# Patient Record
Sex: FEMALE | Race: WHITE
[De-identification: names, ages, dates, MRNs, and addresses within clinical notes are randomized per-mention and may not be internally consistent; named-entity substitution may affect disease eponyms.]

---

## 2023-02-20 ENCOUNTER — HOSPITAL ENCOUNTER (OUTPATIENT)
Facility: HOSPITAL | Age: 53
Setting detail: RECURRING SERIES
Discharge: HOME | End: 2023-02-23
Payer: COMMERCIAL

## 2023-02-20 PROCEDURE — 97161 PT EVAL LOW COMPLEX 20 MIN: CPT

## 2023-02-20 PROCEDURE — 97535 SELF CARE MNGMENT TRAINING: CPT

## 2023-02-20 NOTE — THERAPY EVALUATION
[address] [facility] PHYSICAL THERAPY  [address] [facility], [address], [place], [address] [place] [address] Ph: [phone] Fx: [phone]  Plan of Care / Statement of Necessity for Physical Therapy Services     Patient Name: Dagoberto Whitney : 1970   Medical   Diagnosis: Right knee pain [M25.561]  Treatment Diagnosis: Right knee pain [M25.561]   Onset Date:      Referral Source: Jacob Ugarte, [place]* Start of Care [facility], [place], operated by [facility]): 2023   Prior Hospitalization: See medical history Provider #: [id number]   Prior Level of Function: IND all ADLs with some pain with squatting due to R knee pain   Comorbidities: HTN    Medications: Verified on Patient Summary List     Assessment / key information:  Dagoberto Whitney is a 50 y.o. female who presents to skilled PT for the treatment diagnosis of upper back and neck pain. Recalls that about 8 years ago she states that she \"tore\" a muscle between the shoulder blade towards the spine while working her job. Currently, the pain can also radiate up towards the neck along the L side of the neck. Thinks this got worse initiating crossfit a few weeks ago. She had some numbness down the R arm down to the elbow and to the pink finger. This has resolved. Denies N/T down L UE. Pt is R hand dominant. Denies prior Xrays for the back or neck. The pain has been getting better. Endorses a lot of stiffness in the back. Pt also c/o R knee pain too. This has been going on for a while and has gotten worse since working out. Notes joint noise as well as pain with squats. Pain:   Current: 3/10     Worst: 9/10    Best: 2/10      Objective:    Posture: FHRS, stiffness in T/S, slight increased T/S kyphosis    Functional Activity:  Functional ER - (L) no p!   (R) no p! Functional IR - (L) no p!    (R) limited no p!   Functional reach OH - painful L flexion and ABD with some limitation  Limited R   Cross body stretch - slight pulling for L UE in posterior   Prone lying - no pain   Prone on elbow - no neck pain or back pain    Myotomes: NT    Dermatomes: slight changes in T6 in rhomboid and C8-T1 distribution     Cervical AROM/PROM  Flexion: 25% pain down the L side of the T/S / increased pain in the   Extension: 90% pain alleviation   (L) SB: 75% pain alleviation   (R) SB: 50% p! In the L side with tightness   (L) Rot: 65 deg no p! (R) Rot: 65 deg no p! Thoracolumbar AROM  L Rot: 25% no p!  R Rot: 50% no p! Palpation - CPA to T6 causes some radicular symptoms in the L posterior arm. 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 Pt educated on centralization vs peripheralization. Pt favors extension and L SB for elimination of symptoms. Pt would benefit form skilled PT services addressing the current ROM, strength, functional performance, and balance impairments so that the patient to return to performing all ADLs with minimal restriction/limitation or without any functional limitations. HEP provided to the patient in order to promote improvement and self management of symptoms and functional performance     Evaluation Complexity:  History:  LOW Complexity : Zero comorbidities / personal factors that will impact the outcome / POC; Examination:  MEDIUM Complexity : 3 Standardized tests and measures addressin body structure, function, activity limitation and / or participation in recreation  ;Presentation:  MEDIUM Complexity : Evolving with changing characteristics  ; Clinical Decision Making:  MEDIUM Complexity : FOTO score of 26-74 FOTO score = an established functional score where 100 = no disability  Overall Complexity Rating: LOW   Problem List: pain affecting function, decrease ROM, decrease strength, edema affection function, decrease ADL/functional abilities, decrease activity tolerance, decrease flexibility/joint mobility, and decrease transfer abilities    Treatment Plan may include any combination of the followin Therapeutic Exercise, 18537 Neuromuscular Re-Education, 65991 Manual Therapy, 00335 Therapeutic Activity, 54244 Self Care/Home Management, 96488 Electrical Stim unattended, 00393 Mechanical Traction, 58487 Needle Insertion w/o Injection (1 or 2 muscles), and 97611 Needle Insertion w/o Injection (3+ muscles)  Patient / Family readiness to learn indicated by: asking questions, trying to perform skills, interest, return verbalization , and return demonstration   Persons(s) to be included in education: patient (P)  Barriers to Learning/Limitations: none  Measures taken if barriers to learning present: NA  Patient Goal (s): \"find pain relief when in different positions when I sleep and a better way to workout w/o pain\"  Patient Self Reported Health Status: fair  Rehabilitation Potential: good    Short Term Goals: To be accomplished in 4 weeks  1) Pt will be IND with HEP to facilitate self care management. 2) Pt will improve thoracic rot ROM to >50% to order to improve ability to rotate without restriction  for ADLs    3) Pt will report decreased in neurogenic symptoms >50% for ease of symptoms with ADLs    Long Term Goals: To be accomplished in 6 weeks  1) Pt will maintain an average pain of 3/10 or less with all activities showing a progression in overall pain levels despite increases in activity. 2) Pt will improve FOTO scores to 67 in order to show detectable change in overall function. 3) Pt will be able to sleep without disturbance due to upper back pain >6 hours.    4) Pt will improve thoracic rot ROM to >75% and cervical ROM >75% without symptoms to order to improve ability to move without restriction  for ADLs      Frequency / Duration: Patient to be seen 2 times per week for 6 weeks    Patient/ Caregiver education and instruction: Diagnosis, prognosis, self care, activity modification, and exercises [x]  Plan of care has been reviewed with PTA    Certification Period: SAL Alfred, PT       2/20/2023       7:46 AM  ===================================================================  I certify that the above Therapy Services are being furnished while the patient is under my care. I agree with the treatment plan and certify that this therapy is necessary. [de-identified] Signature:_________________________   DATE:_________   TIME:________                           ODETTE Canseco*    ** Signature, Date and Time must be completed for valid certification **  Please sign and fax to Marcel Olsen [facility]6 [phone].   Thank you

## 2023-02-20 NOTE — PROGRESS NOTES
PHYSICAL / OCCUPATIONAL THERAPY - DAILY TREATMENT NOTE (updated )  For Eval visit    Patient Name: Ramiro Black    Date: 2023    : 1970  Insurance: Payor: Lily Marte / Plan: Lily Marte / Product Type: *No Product type* /      Patient  verified yes     Visit #   Current / Total 1 12   Time   In / Out 1000 1055   Pain   In / Out 3 2   Subjective Functional Status/Changes: See POC   Changes to:  Meds, Allergies, Med Hx, Sx Hx? If yes, update Summary List no       TREATMENT AREA =  Right knee pain [M25.561]  Low back pain [M54.50]    OBJECTIVE    Modalities Rationale:     decrease edema, decrease inflammation, and decrease pain to improve patient's ability to progress to PLOF and address remaining functional goals. min [] Estim Unattended, type/location:                                      []  w/ice    []  w/heat    min [] Estim Attended, type/location:                                     []  w/US     []  w/ice    []  w/heat    []  TENS insruct      min []  Mechanical Traction: type/lbs                   []  pro   []  sup   []  int   []  cont    []  before manual    []  after manual    min []  Ultrasound, settings/location:      min []  Iontophoresis w/ dexamethasone, location:                                               []  take home patch       []  in clinic   10 min  unbill [x]  Ice     []  Heat    location/position: Supine CS and TS    min []  Paraffin,  details:     min []  Vasopneumatic Device, press/temp:     min []  Mere Flakes / Elfredia Skeeters: If using vaso (only need to measure limb vaso being performed on)      pre-treatment girth :       post-treatment girth :       measured at (landmark location) :      min []  Other:    Skin assessment post-treatment (if applicable):    []  intact    []  redness- no adverse reaction                 []redness - adverse reaction:           20 min   Eval - untimed                      Therapeutic Procedures:   Tx Min Billable or 1:1 Min (if diff from [employer]) Procedure, Rationale, Specifics   25  03908 Self Care/Home Management (timed):  improve patient knowledge and understanding of pain reducing techniques, positioning, posture/ergonomics, home safety, activity modification, diagnosis/prognosis, physical therapy expectations, procedures and progression, and centralization of symptoms, directional preference  to improve patient's ability to progress to PLOF and address remaining functional goals. (see flow sheet as applicable)     Details if applicable:            Details if applicable:            Details if applicable:            Details if applicable:     [address] [facility] Totals Reminder: bill using total billable min of TIMED therapeutic procedures (example: do not include dry needle or estim unattended, both untimed codes, in totals to left)  8-22 min = 1 unit; 23-37 min = 2 units; 38-52 min = 3 units; 53-67 min = 4 units; 68-82 min = 5 units   Total Total     [x]  Patient Education billed concurrently with other procedures   [x] Review HEP    [] Progressed/Changed HEP, detail:    [] Other detail:       Objective Information/Functional Measures/Assessment    See POC    Patient will continue to benefit from skilled PT / OT services to modify and progress therapeutic interventions, analyze and address functional mobility deficits, analyze and address ROM deficits, analyze and address strength deficits, analyze and address soft tissue restrictions, analyze and cue for proper movement patterns, and analyze and modify for postural abnormalities to address functional deficits and attain remaining goals. Progress toward goals / Updated goals:  [x]  See POC    Short Term Goals: To be accomplished in 4 weeks  1) Pt will be IND with HEP to facilitate self care management.    2) Pt will improve thoracic rot ROM to >50% to order to improve ability to rotate without restriction  for ADLs    3) Pt will report decreased in neurogenic symptoms >50% for ease of symptoms with ADLs Long Term Goals: To be accomplished in 6 weeks  1) Pt will maintain an average pain of 3/10 or less with all activities showing a progression in overall pain levels despite increases in activity. 2) Pt will improve FOTO scores to 67 in order to show detectable change in overall function. 3) Pt will be able to sleep without disturbance due to upper back pain >6 hours.    4) Pt will improve thoracic rot ROM to >75% and cervical ROM >75% without symptoms to order to improve ability to move without restriction  for ADLs      PLAN  yes Continue plan of care  []  Upgrade activities as tolerated  []  Discharge due to :  []  Other:    Natalia Zhou, PT    2/20/2023    12:49 PM    Future Appointments   Date Time Provider Madeline More   2/23/2023  3:00 PM Natalia Zhou, PT [facility]   2/28/2023  4:00 PM Kristine Bryant, PTA [facility]   3/2/2023  2:30 PM Natalia Zhou, PT [facility]   3/7/2023  4:30 PM Natalia Zhou, PT [facility]   3/9/2023  4:30 PM Natalia Zhou, PT [facility]   3/14/2023  4:30 PM Natalia Zhou, PT [facility]   3/16/2023  4:30 PM Natalia Zhou, PT [facility]   3/21/2023  5:00 PM Natalia Zhou, PT [facility]   3/23/2023  4:30 PM Natalia Zhou, PT [facility]

## 2023-02-23 ENCOUNTER — HOSPITAL ENCOUNTER (OUTPATIENT)
Facility: HOSPITAL | Age: 53
Setting detail: RECURRING SERIES
Discharge: HOME | End: 2023-02-26
Payer: COMMERCIAL

## 2023-02-23 PROCEDURE — 97112 NEUROMUSCULAR REEDUCATION: CPT

## 2023-02-23 PROCEDURE — 97140 MANUAL THERAPY 1/> REGIONS: CPT

## 2023-02-23 PROCEDURE — 97110 THERAPEUTIC EXERCISES: CPT

## 2023-02-23 NOTE — PROGRESS NOTES
PHYSICAL / OCCUPATIONAL THERAPY - DAILY TREATMENT NOTE (updated )    Patient Name: Xavier Lab    Date: 2023    : 1970  Insurance: Payor: Paulo Gomez / Plan: Paulo Prina / Product Type: *No Product type* /      Patient  verified Yes     Visit #   Current / Total 2 12   Time   In / Out 300 340   Pain   In / Out 5/10 3/10   Subjective Functional Status/Changes: The exercises seems to clear up the L arm pain. Feeling some pain in the  middle of the back area. Changes to:  Meds, Allergies, Med Hx, Sx Hx? If yes, update Summary List no       TREATMENT AREA =  Pain in thoracic spine [M54.6]    OBJECTIVE    Modalities Rationale:     decrease edema, decrease inflammation, and decrease pain to improve patient's ability to progress to PLOF and address remaining functional goals. min [] Estim Unattended, type/location:                                      []  w/ice    []  w/heat    min [] Estim Attended, type/location:                                     []  w/US     []  w/ice    []  w/heat    []  TENS insruct      min []  Mechanical Traction: type/lbs                   []  pro   []  sup   []  int   []  cont    []  before manual    []  after manual    min []  Ultrasound, settings/location:      min []  Iontophoresis w/ dexamethasone, location:                                               []  take home patch       []  in clinic   10 min  unbill [x]  Ice     []  Heat    location/position: Supine TS    min []  Paraffin,  details:     min []  Vasopneumatic Device, press/temp:     min []  Alonzo Spittle / Israel Sexton: If using vaso (only need to measure limb vaso being performed on)      pre-treatment girth :       post-treatment girth :       measured at (landmark location) :      min []  Other:    Skin assessment post-treatment (if applicable):    []  intact    []  redness- no adverse reaction                 []redness - adverse reaction:         Therapeutic Procedures:   Tx Min Billable or 1:1 Min (if diff from [employer]) Procedure, Rationale, Specifics   8  61664 Manual Therapy (timed):  decrease pain, increase ROM, and increase tissue extensibility to improve patient's ability to progress to PLOF and address remaining functional goals. The manual therapy interventions were performed at a separate and distinct time from the therapeutic activities interventions . (see flow sheet as applicable)     Details if applicable:    Prone over 1 pillow - T/S L rot mobs, via B TP CPA for glide, R UPA for L rot gr 2-4     12  93564 Therapeutic Exercise (timed):  increase ROM, strength, coordination, balance, and proprioception to improve patient's ability to progress to PLOF and address remaining functional goals. (see flow sheet as applicable)     Details if applicable:     10  53962 Self Care/Home Management (timed):  improve patient knowledge and understanding of pain reducing techniques, positioning, diagnosis/prognosis, and updated HEP  to improve patient's ability to progress to PLOF and address remaining functional goals. (see flow sheet as applicable)     Details if applicable:            Details if applicable:            Details if applicable:     27  [facility] Totals Reminder: bill using total billable min of TIMED therapeutic procedures (example: do not include dry needle or estim unattended, both untimed codes, in totals to left)  8-22 min = 1 unit; 23-37 min = 2 units; 38-52 min = 3 units; 53-67 min = 4 units; 68-82 min = 5 units   Total Total     [x]  Patient Education billed concurrently with other procedures   [x] Review HEP    [x] Progressed/Changed HEP, detail:  Access Code: [email or web address]  URL: [email or web address]  Date: 02/23/2023  Prepared by: Aditi Saez    Exercises  Seated Cervical Extension AROM - 1-4 x daily - 7 x weekly - 10-15 reps  Cervical Extension Prone on Elbows - 1-4 x daily - 7 x weekly - 10-15 reps  Seated Cervical Sidebending AROM - 1-4 x daily - 7 x weekly - 10-15 reps  Seated Thoracic Flexion and Rotation with Arms Crossed - 1-4 x daily - 7 x weekly - 5 reps  Ulnar Nerve Flossing - 1-2 x daily - 7 x weekly - 3 sets - 10 reps  Sidelying Open Book Thoracic Lumbar Rotation and Extension - 1-2 x daily - 7 x weekly - 10 reps  Sleeper Stretch - 1-2 x daily - 7 x weekly - 10 reps - 5\" hold  Standing Posterior Cuff Stretch - 1-4 x daily - 7 x weekly - 2 sets - 20-30\" hold    [] Other detail:       Objective Information/Functional Measures/Assessment    Patient tolerated today's treatment well. L rot more limited than the R rot. Improved greatly post manual. Added shoulder posterior capsule stretches to HEP and open books    Patient will continue to benefit from skilled PT / OT services to modify and progress therapeutic interventions, analyze and address functional mobility deficits, analyze and address ROM deficits, analyze and address strength deficits, analyze and address soft tissue restrictions, analyze and cue for proper movement patterns, analyze and modify for postural abnormalities, and instruct in home and community integration to address functional deficits and attain remaining goals. Progress toward goals / Updated goals:  []  See Progress Note/Recertification    Short Term Goals: To be accomplished in 4 weeks  1) Pt will be IND with HEP to facilitate self care management. 2) Pt will improve thoracic rot ROM to >50% to order to improve ability to rotate without restriction  for ADLs  Progressing 2/23/23  3) Pt will report decreased in neurogenic symptoms >50% for ease of symptoms with ADLs     Long Term Goals: To be accomplished in 6 weeks  1) Pt will maintain an average pain of 3/10 or less with all activities showing a progression in overall pain levels despite increases in activity. 2) Pt will improve FOTO scores to 67 in order to show detectable change in overall function. 3) Pt will be able to sleep without disturbance due to upper back pain >6 hours.    4) Pt will improve thoracic rot ROM to >75% and cervical ROM >75% without symptoms to order to improve ability to move without restriction  for ADLs      All goals remain applicable at this time.      PLAN  Yes  Continue plan of care  [x]  Upgrade activities as tolerated  []  Discharge due to :  []  Other:    Geno Freeman, PT    2/23/2023    1:12 PM    Future Appointments   Date Time Provider Madeline More   2/23/2023  3:00 PM Geno Freeman, PT [facility]   2/28/2023  4:00 PM Earnstine Ring, PTA [facility]   3/2/2023  2:30 PM Geno Creed, PT [facility]   3/7/2023  4:30 PM Geno Creed, PT [facility]   3/9/2023  4:30 PM Earnstine Ring, PTA [facility]   3/14/2023  4:30 PM Billye Creed, PT [facility]   3/16/2023  4:30 PM Earnstine Ring, PTA [facility]   3/21/2023  5:00 PM Earnstine Ring, [facility]   3/23/2023  4:30 PM Billye Creed, PT [facility]

## 2023-02-28 ENCOUNTER — HOSPITAL ENCOUNTER (OUTPATIENT)
Facility: HOSPITAL | Age: 53
Setting detail: RECURRING SERIES
Discharge: HOME | End: 2023-03-03
Payer: COMMERCIAL

## 2023-02-28 PROCEDURE — 97110 THERAPEUTIC EXERCISES: CPT

## 2023-02-28 PROCEDURE — 97140 MANUAL THERAPY 1/> REGIONS: CPT

## 2023-02-28 NOTE — PROGRESS NOTES
PHYSICAL / OCCUPATIONAL THERAPY - DAILY TREATMENT NOTE (updated )    Patient Name: Sanket Model    Date: 2023    : 1970  Insurance: Payor: Liban Pardo / Plan: Liban Pardo / Product Type: *No Product type* /      Patient  verified Yes     Visit #   Current / Total 3 12   Time   In / Out 400 450   Pain   In / Out 2 1   Subjective Functional Status/Changes: Felt better after last session and it's been doing good. No as stiff as I usually am and i've been keeping up the the exercises. Changes to:  Meds, Allergies, Med Hx, Sx Hx? If yes, update Summary List no       TREATMENT AREA =  Pain in thoracic spine [M54.6]    OBJECTIVE    Modalities Rationale:     decrease edema, decrease inflammation, and decrease pain to improve patient's ability to progress to PLOF and address remaining functional goals. min [] Estim Unattended, type/location:                                      []  w/ice    []  w/heat    min [] Estim Attended, type/location:                                     []  w/US     []  w/ice    []  w/heat    []  TENS insruct      min []  Mechanical Traction: type/lbs                   []  pro   []  sup   []  int   []  cont    []  before manual    []  after manual    min []  Ultrasound, settings/location:      min []  Iontophoresis w/ dexamethasone, location:                                               []  take home patch       []  in clinic   10 min  unbill [x]  Ice     []  Heat    location/position: Supine TS    min []  Paraffin,  details:     min []  Vasopneumatic Device, press/temp:     min []  Jax Black / Fela Lyme: If using vaso (only need to measure limb vaso being performed on)      pre-treatment girth :       post-treatment girth :       measured at (landmark location) :      min []  Other:    Skin assessment post-treatment (if applicable):    []  intact    []  redness- no adverse reaction                 []redness - adverse reaction:         Therapeutic Procedures:   Tx Min Billable or 1:1 Min (if diff from Tx Min) Procedure, Rationale, Specifics   15  75270 Manual Therapy (timed):  decrease pain, increase ROM, and increase tissue extensibility to improve patient's ability to progress to PLOF and address remaining functional goals. The manual therapy interventions were performed at a separate and distinct time from the therapeutic activities interventions . (see flow sheet as applicable)     Details if applicable:    Prone over 1 pillow - T/S L rot mobs, via B TP CPA for glide, R UPA for L rot gr 2-4     25  20371 Therapeutic Exercise (timed):  increase ROM, strength, coordination, balance, and proprioception to improve patient's ability to progress to PLOF and address remaining functional goals. (see flow sheet as applicable)     Details if applicable:       54114 Self Care/Home Management (timed):  improve patient knowledge and understanding of pain reducing techniques, positioning, diagnosis/prognosis, and updated HEP  to improve patient's ability to progress to PLOF and address remaining functional goals. (see flow sheet as applicable)     Details if applicable:            Details if applicable:            Details if applicable:     36  [facility] Totals Reminder: bill using total billable min of TIMED therapeutic procedures (example: do not include dry needle or estim unattended, both untimed codes, in totals to left)  8-22 min = 1 unit; 23-37 min = 2 units; 38-52 min = 3 units; 53-67 min = 4 units; 68-82 min = 5 units   Total Total     [x]  Patient Education billed concurrently with other procedures   [x] Review HEP    [x] Progressed/Changed HEP, detail:      [] Other detail:       Objective Information/Functional Measures/Assessment    Limited motion of T/S with decrease spring test.  Add 1/2 foam supine x 3 minutes and 1/2 foam perpendicular 5 x 5 seconds.     Patient will continue to benefit from skilled PT / OT services to modify and progress therapeutic interventions, analyze and address functional mobility deficits, analyze and address ROM deficits, analyze and address strength deficits, analyze and address soft tissue restrictions, analyze and cue for proper movement patterns, analyze and modify for postural abnormalities, and instruct in home and community integration to address functional deficits and attain remaining goals. Progress toward goals / Updated goals:  []  See Progress Note/Recertification    Short Term Goals: To be accomplished in 4 weeks  1) Pt will be IND with HEP to facilitate self care management. 2) Pt will improve thoracic rot ROM to >50% to order to improve ability to rotate without restriction  for ADLs  Progressing 2/23/23  3) Pt will report decreased in neurogenic symptoms >50% for ease of symptoms with ADLs     Long Term Goals: To be accomplished in 6 weeks  1) Pt will maintain an average pain of 3/10 or less with all activities showing a progression in overall pain levels despite increases in activity. 2) Pt will improve FOTO scores to 67 in order to show detectable change in overall function. 3) Pt will be able to sleep without disturbance due to upper back pain >6 hours. 4) Pt will improve thoracic rot ROM to >75% and cervical ROM >75% without symptoms to order to improve ability to move without restriction  for ADLs      All goals remain applicable at this time.      PLAN  Yes  Continue plan of care  [x]  Upgrade activities as tolerated  []  Discharge due to :  []  Other:    Eloy Friedman PTA    2/28/2023    8:55 AM    Future Appointments   Date Time Provider Madeline More   2/28/2023  4:00 PM Fly Dukes [facility]   3/2/2023  2:30 PM Vicky Souza, PT [facility]   3/7/2023  4:30 PM Vicky Souza, PT [facility]   3/9/2023  4:30 PM Eloy Friedman PTA [facility]   3/14/2023  4:30 PM Vicky Souza, PT [facility]   3/16/2023  4:30 PM Eloy Friedman PTA [facility]   3/21/2023  5:00 PM Eloy Friedman PTA [facility] 3/23/2023  4:30 PM Jacoby Chaudhry, PT [facility] [address]

## 2023-03-02 ENCOUNTER — HOSPITAL ENCOUNTER (OUTPATIENT)
Facility: HOSPITAL | Age: 53
Setting detail: RECURRING SERIES
Discharge: HOME | End: 2023-03-05
Payer: COMMERCIAL

## 2023-03-02 PROCEDURE — 97110 THERAPEUTIC EXERCISES: CPT

## 2023-03-02 PROCEDURE — 97140 MANUAL THERAPY 1/> REGIONS: CPT

## 2023-03-02 NOTE — PROGRESS NOTES
PHYSICAL / OCCUPATIONAL THERAPY - DAILY TREATMENT NOTE (updated )    Patient Name: Alley Rosario    Date: 3/2/2023    : 1970  Insurance: Payor: Ervin Gell / Plan: Ervin Gell / Product Type: *No Product type* /      Patient  verified Yes     Visit #   Current / Total 4 12   Time   In / Out 1105 1145   Pain   In / Out 1/10 1/10   Subjective Functional Status/Changes: State s that looking ot the L has gotten a lot better and less stiffness. Changes to:  Meds, Allergies, Med Hx, Sx Hx? If yes, update Summary List no       TREATMENT AREA =  Pain in thoracic spine [M54.6]    OBJECTIVE    Modalities Rationale:     decrease edema, decrease inflammation, and decrease pain to improve patient's ability to progress to PLOF and address remaining functional goals. min [] Estim Unattended, type/location:                                      []  w/ice    []  w/heat    min [] Estim Attended, type/location:                                     []  w/US     []  w/ice    []  w/heat    []  TENS insruct      min []  Mechanical Traction: type/lbs                   []  pro   []  sup   []  int   []  cont    []  before manual    []  after manual    min []  Ultrasound, settings/location:      min []  Iontophoresis w/ dexamethasone, location:                                               []  take home patch       []  in clinic   10 min  unbill [x]  Ice     []  Heat    location/position: Supine TS    min []  Paraffin,  details:     min []  Vasopneumatic Device, press/temp:     min []  Jodene Massing / Missy Silvius: If using vaso (only need to measure limb vaso being performed on)      pre-treatment girth :       post-treatment girth :       measured at (landmark location) :      min []  Other:    Skin assessment post-treatment (if applicable):    []  intact    []  redness- no adverse reaction                 []redness - adverse reaction:         Therapeutic Procedures:   Tx Min Billable or 1:1 Min (if diff from [employer]) Procedure, Rationale, Specifics   20  03032 Manual Therapy (timed):  decrease pain, increase ROM, and increase tissue extensibility to improve patient's ability to progress to PLOF and address remaining functional goals.  The manual therapy interventions were performed at a separate and distinct time from the therapeutic activities interventions . (see flow sheet as applicable)     Details if applicable:    Prone over 1 pillow - T/S L rot mobs, via B TP CPA for glide, R UPA for L rot gr 2-4  Seated L rot stretch with PT O/P     10  05491 Therapeutic Exercise (timed):  increase ROM, strength, coordination, balance, and proprioception to improve patient's ability to progress to PLOF and address remaining functional goals. (see flow sheet as applicable)     Details if applicable:            Details if applicable:            Details if applicable:            Details if applicable:     30  [facility] Totals Reminder: bill using total billable min of TIMED therapeutic procedures (example: do not include dry needle or estim unattended, both untimed codes, in totals to left)  8-22 min = 1 unit; 23-37 min = 2 units; 38-52 min = 3 units; 53-67 min = 4 units; 68-82 min = 5 units   Total Total     [x]  Patient Education billed concurrently with other procedures   [x] Review HEP    [] Progressed/Changed HEP, detail:  [] Other detail:       Objective Information/Functional Measures/Assessment    Much better L rot ORM by end of manual. Tried doing long ways foam roller X to Y and reported sharp pin point pain in the L rhomboid. No pain with anything else except palpation to the area. Advised to ice to reduce local inflammation. Overall her mobility is getting a lot better    Patient will continue to benefit from skilled PT / OT services to modify and progress therapeutic interventions, analyze and address functional mobility deficits, analyze and address ROM deficits, analyze and address strength deficits, analyze and address soft tissue restrictions,  analyze and cue for proper movement patterns, analyze and modify for postural abnormalities, and instruct in home and community integration to address functional deficits and attain remaining goals. Progress toward goals / Updated goals:  []  See Progress Note/Recertification    Short Term Goals: To be accomplished in 4 weeks  1) Pt will be IND with HEP to facilitate self care management. 2) Pt will improve thoracic rot ROM to >50% to order to improve ability to rotate without restriction  for ADLs  Progressing 2/23/23  3) Pt will report decreased in neurogenic symptoms >50% for ease of symptoms with ADLs. Slight progress (some tingling last session but overall that has gotten better) 3/2/23     Long Term Goals: To be accomplished in 6 weeks  1) Pt will maintain an average pain of 3/10 or less with all activities showing a progression in overall pain levels despite increases in activity. 2) Pt will improve FOTO scores to 67 in order to show detectable change in overall function. 3) Pt will be able to sleep without disturbance due to upper back pain >6 hours. 4) Pt will improve thoracic rot ROM to >75% and cervical ROM >75% without symptoms to order to improve ability to move without restriction  for ADLs      All goals remain applicable at this time.      PLAN  Yes  Continue plan of care  [x]  Upgrade activities as tolerated  []  Discharge due to :  []  Other:    Deanna Rollins, PT    3/2/2023    10:00 AM    Future Appointments   Date Time Provider Madeline More   3/2/2023 11:00 AM Deanna Rollins, PT [facility]   3/7/2023  4:30 PM Deanna Rollins, PT [facility]   3/9/2023  4:30 PM Bijan Branham PTA [facility]   3/14/2023  4:30 PM Deanna Rollins, PT [facility]   3/16/2023  4:30 PM Bijan Branham PTA [facility]   3/21/2023  5:00 PM [facility]   3/23/2023  4:30 PM Deanna Rollins PT [facility]

## 2023-03-07 ENCOUNTER — HOSPITAL ENCOUNTER (OUTPATIENT)
Facility: HOSPITAL | Age: 53
Setting detail: RECURRING SERIES
Discharge: HOME | End: 2023-03-10
Payer: COMMERCIAL

## 2023-03-07 PROCEDURE — 97140 MANUAL THERAPY 1/> REGIONS: CPT

## 2023-03-07 PROCEDURE — 97110 THERAPEUTIC EXERCISES: CPT

## 2023-03-07 NOTE — PROGRESS NOTES
PHYSICAL / OCCUPATIONAL THERAPY - DAILY TREATMENT NOTE (updated )    Patient Name: Joanie Lab    Date: 3/7/2023    : 1970  Insurance: Payor: Milli Ornelas / Plan: Milli Ornelas / Product Type: *No Product type* /      Patient  verified Yes     Visit #   Current / Total 5 12   Time   In / Out 430  530   Pain   In / Out 1.5/10  0/10   Subjective Functional Status/Changes: Flared up the spot today moving boxes. Changes to:  Meds, Allergies, Med Hx, Sx Hx? If yes, update Summary List no       TREATMENT AREA =  Pain in thoracic spine [M54.6]    OBJECTIVE    Modalities Rationale:     decrease edema, decrease inflammation, and decrease pain to improve patient's ability to progress to PLOF and address remaining functional goals. min [] Estim Unattended, type/location:                                      []  w/ice    []  w/heat    min [] Estim Attended, type/location:                                     []  w/US     []  w/ice    []  w/heat    []  TENS insruct      min []  Mechanical Traction: type/lbs                   []  pro   []  sup   []  int   []  cont    []  before manual    []  after manual    min []  Ultrasound, settings/location:      min []  Iontophoresis w/ dexamethasone, location:                                               []  take home patch       []  in clinic   10 min  unbill [x]  Ice     []  Heat    location/position: Supine TS    min []  Paraffin,  details:     min []  Vasopneumatic Device, press/temp:     min []  Nikolaylar Gunning / Jason Freud: If using vaso (only need to measure limb vaso being performed on)      pre-treatment girth :       post-treatment girth :       measured at (landmark location) :      min []  Other:    Skin assessment post-treatment (if applicable):    []  intact    []  redness- no adverse reaction                 []redness - adverse reaction:         Therapeutic Procedures:   Tx Min Billable or 1:1 Min (if diff from Tx Min) Procedure, Rationale, Specifics   [id number] Manual Therapy (timed):  decrease pain, increase ROM, and increase tissue extensibility to improve patient's ability to progress to PLOF and address remaining functional goals. The manual therapy interventions were performed at a separate and distinct time from the therapeutic activities interventions . (see flow sheet as applicable)     Details if applicable:    Prone over 1 pillow - T/S L rot mobs, via B TP CPA for glide, R UPA for L rot gr 2-4  Prone/supine DTM along L UT, LS, CS paraspinals     20  21270 Therapeutic Exercise (timed):  increase ROM, strength, coordination, balance, and proprioception to improve patient's ability to progress to PLOF and address remaining functional goals. (see flow sheet as applicable)     Details if applicable:            Details if applicable:            Details if applicable:            Details if applicable:     48   [facility] Totals Reminder: bill using total billable min of TIMED therapeutic procedures (example: do not include dry needle or estim unattended, both untimed codes, in totals to left)  8-22 min = 1 unit; 23-37 min = 2 units; 38-52 min = 3 units; 53-67 min = 4 units; 68-82 min = 5 units   Total Total     [x]  Patient Education billed concurrently with other procedures   [x] Review HEP    [] Progressed/Changed HEP, detail:  [] Other detail:       Objective Information/Functional Measures/Assessment    Pt shown kneeling TS extension mobility exercises today.  Overall she tolerated this much better than other TS ext stretches    Patient will continue to benefit from skilled PT / OT services to modify and progress therapeutic interventions, analyze and address functional mobility deficits, analyze and address ROM deficits, analyze and address strength deficits, analyze and address soft tissue restrictions, analyze and cue for proper movement patterns, analyze and modify for postural abnormalities, and instruct in home and community integration to address functional deficits and attain remaining goals. Progress toward goals / Updated goals:  []  See Progress Note/Recertification    Short Term Goals: To be accomplished in 4 weeks  1) Pt will be IND with HEP to facilitate self care management. 2) Pt will improve thoracic rot ROM to >50% to order to improve ability to rotate without restriction  for ADLs  Progressing 2/23/23  3) Pt will report decreased in neurogenic symptoms >50% for ease of symptoms with ADLs. Slight progress (some tingling last session but overall that has gotten better) 3/2/23     Long Term Goals: To be accomplished in 6 weeks  1) Pt will maintain an average pain of 3/10 or less with all activities showing a progression in overall pain levels despite increases in activity. 2) Pt will improve FOTO scores to 67 in order to show detectable change in overall function. 3) Pt will be able to sleep without disturbance due to upper back pain >6 hours. 4) Pt will improve thoracic rot ROM to >75% and cervical ROM >75% without symptoms to order to improve ability to move without restriction  for ADLs      All goals remain applicable at this time.      PLAN  Yes  Continue plan of care  [x]  Upgrade activities as tolerated  []  Discharge due to :  []  Other:    Kyleigh Garzon, PT    3/7/2023    2:10 PM    Future Appointments   Date Time Provider Madeline More   3/7/2023  4:30 PM Kyleigh Garzon, PT [facility] [address]   3/9/2023  4:30 PM Cb Wolf, [facility] [address]   3/14/2023  4:30 PM Kyleigh Garzon PT [facility] [address]   3/16/2023  4:30 PM Cb Wolf PTA [facility] [address]   3/21/2023  5:00 PM Cb Wolf [facility] [address]   3/23/2023  4:30 PM [facility] [address]

## 2023-03-09 ENCOUNTER — HOSPITAL ENCOUNTER (OUTPATIENT)
Facility: HOSPITAL | Age: 53
Setting detail: RECURRING SERIES
Discharge: HOME | End: 2023-03-12
Payer: COMMERCIAL

## 2023-03-09 PROCEDURE — 97140 MANUAL THERAPY 1/> REGIONS: CPT

## 2023-03-09 PROCEDURE — 97110 THERAPEUTIC EXERCISES: CPT

## 2023-03-09 NOTE — PROGRESS NOTES
PHYSICAL / OCCUPATIONAL THERAPY - DAILY TREATMENT NOTE (updated )    Patient Name: Faith Carney    Date: 3/9/2023    : 1970  Insurance: Payor: Eloina Linder / Plan: Eloina Linder / Product Type: *No Product type* /      Patient  verified Yes     Visit #   Current / Total 6 12   Time   In / Out 430 520   Pain   In / Out 0 1   Subjective Functional Status/Changes: I've got this numbness in my (R) elbow area that comes and goes and today it hasn't gone away. But right now I don't really have pain. Changes to:  Meds, Allergies, Med Hx, Sx Hx? If yes, update Summary List no       TREATMENT AREA =  Pain in thoracic spine [M54.6]    OBJECTIVE    Modalities Rationale:     decrease edema, decrease inflammation, and decrease pain to improve patient's ability to progress to PLOF and address remaining functional goals. min [] Estim Unattended, type/location:                                      []  w/ice    []  w/heat    min [] Estim Attended, type/location:                                     []  w/US     []  w/ice    []  w/heat    []  TENS insruct      min []  Mechanical Traction: type/lbs                   []  pro   []  sup   []  int   []  cont    []  before manual    []  after manual    min []  Ultrasound, settings/location:      min []  Iontophoresis w/ dexamethasone, location:                                               []  take home patch       []  in clinic   10 min  unbill [x]  Ice     []  Heat    location/position: Supine TS    min []  Paraffin,  details:     min []  Vasopneumatic Device, press/temp:     min []  Elvira Cuellar / Mayra Robles: If using vaso (only need to measure limb vaso being performed on)      pre-treatment girth :       post-treatment girth :       measured at (landmark location) :      min []  Other:    Skin assessment post-treatment (if applicable):    []  intact    []  redness- no adverse reaction                 []redness - adverse reaction:         Therapeutic Procedures:   Tx Min Billable or 1:1 Min (if diff from Tx Min) Procedure, Rationale, Specifics   15  29615 Manual Therapy (timed):  decrease pain, increase ROM, and increase tissue extensibility to improve patient's ability to progress to PLOF and address remaining functional goals. The manual therapy interventions were performed at a separate and distinct time from the therapeutic activities interventions . (see flow sheet as applicable)     Details if applicable:    Prone over 1 pillow - T/S L rot mobs, via B TP CPA for glide, R UPA for L rot gr 2-4     25  37453 Therapeutic Exercise (timed):  increase ROM, strength, coordination, balance, and proprioception to improve patient's ability to progress to PLOF and address remaining functional goals. (see flow sheet as applicable)     Details if applicable:       19689 Self Care/Home Management (timed):  improve patient knowledge and understanding of pain reducing techniques, positioning, diagnosis/prognosis, and updated HEP  to improve patient's ability to progress to PLOF and address remaining functional goals. (see flow sheet as applicable)     Details if applicable:            Details if applicable:            Details if applicable:     36  [facility] Totals Reminder: bill using total billable min of TIMED therapeutic procedures (example: do not include dry needle or estim unattended, both untimed codes, in totals to left)  8-22 min = 1 unit; 23-37 min = 2 units; 38-52 min = 3 units; 53-67 min = 4 units; 68-82 min = 5 units   Total Total     [x]  Patient Education billed concurrently with other procedures   [x] Review HEP    [x] Progressed/Changed HEP, detail:      [] Other detail:       Objective Information/Functional Measures/Assessment    Reports able to sleep 6 hours and not being disturbed from pain. Increase spring mobility of T/S.     Patient will continue to benefit from skilled PT / OT services to modify and progress therapeutic interventions, analyze and address functional mobility deficits, analyze and address ROM deficits, analyze and address strength deficits, analyze and address soft tissue restrictions, analyze and cue for proper movement patterns, analyze and modify for postural abnormalities, and instruct in home and community integration to address functional deficits and attain remaining goals. Progress toward goals / Updated goals:  []  See Progress Note/Recertification    Short Term Goals: To be accomplished in 4 weeks  1) Pt will be IND with HEP to facilitate self care management. 2) Pt will improve thoracic rot ROM to >50% to order to improve ability to rotate without restriction  for ADLs  Progressing 2/23/23  3) Pt will report decreased in neurogenic symptoms >50% for ease of symptoms with ADLs: progressing well 3/9/23     Long Term Goals: To be accomplished in 6 weeks  1) Pt will maintain an average pain of 3/10 or less with all activities showing a progression in overall pain levels despite increases in activity. 2) Pt will improve FOTO scores to 67 in order to show detectable change in overall function. 3) Pt will be able to sleep without disturbance due to upper back pain >6 hours. Progressing well 3/9/23  4) Pt will improve thoracic rot ROM to >75% and cervical ROM >75% without symptoms to order to improve ability to move without restriction  for ADLs      All goals remain applicable at this time.      PLAN  Yes  Continue plan of care  [x]  Upgrade activities as tolerated  []  Discharge due to :  []  Other:    Stephanie Valdivia PTA    3/9/2023    9:01 AM    Future Appointments   Date Time Provider Madeline More   3/9/2023  4:30 PM Stephanie Valdivia PTA [facility]   3/14/2023  4:30 PM Leonard Murphy PT [facility]   3/16/2023  4:30 PM Stephanie Skip PTA [facility]   3/21/2023  5:00 PM Stephanie MICHEAL Valdivia [facility]   3/23/2023  4:30 [facility]

## 2023-03-14 ENCOUNTER — HOSPITAL ENCOUNTER (OUTPATIENT)
Facility: HOSPITAL | Age: 53
Setting detail: RECURRING SERIES
Discharge: HOME | End: 2023-03-17
Payer: COMMERCIAL

## 2023-03-14 PROCEDURE — 97140 MANUAL THERAPY 1/> REGIONS: CPT

## 2023-03-14 PROCEDURE — 97110 THERAPEUTIC EXERCISES: CPT

## 2023-03-14 NOTE — PROGRESS NOTES
PHYSICAL / OCCUPATIONAL THERAPY - DAILY TREATMENT NOTE (updated )    Patient Name: Evans Burns    Date: 3/14/2023    : 1970  Insurance: Payor: Patrina Schirmer / Plan: Patrina Schirmer / Product Type: *No Product type* /      Patient  verified Yes     Visit #   Current / Total 7 12   Time   In / Out 435 510   Pain   In / Out 3/10 1/10   Subjective Functional Status/Changes: Pain is worse in the L rhomboid area today. Unsure if overdid it at work with lifting heavier boxes. Changes to:  Meds, Allergies, Med Hx, Sx Hx? If yes, update Summary List no       TREATMENT AREA =  Pain in thoracic spine [M54.6]    OBJECTIVE    Modalities Rationale:     decrease edema, decrease inflammation, and decrease pain to improve patient's ability to progress to PLOF and address remaining functional goals. min [] Estim Unattended, type/location:                                      []  w/ice    []  w/heat    min [] Estim Attended, type/location:                                     []  w/US     []  w/ice    []  w/heat    []  TENS insruct      min []  Mechanical Traction: type/lbs                   []  pro   []  sup   []  int   []  cont    []  before manual    []  after manual    min []  Ultrasound, settings/location:      min []  Iontophoresis w/ dexamethasone, location:                                               []  take home patch       []  in clinic   10 min  unbill [x]  Ice     []  Heat    location/position: Supine TS    min []  Paraffin,  details:     min []  Vasopneumatic Device, press/temp:     min []  Tamar Piles / Alia Alba: If using vaso (only need to measure limb vaso being performed on)      pre-treatment girth :       post-treatment girth :       measured at (landmark location) :      min []  Other:    Skin assessment post-treatment (if applicable):    []  intact    []  redness- no adverse reaction                 []redness - adverse reaction:         Therapeutic Procedures:   Tx Min Billable or 1:1 Min (if diff from Tx Min) Procedure, Rationale, Specifics   15  90760 Manual Therapy (timed):  decrease pain, increase ROM, and increase tissue extensibility to improve patient's ability to progress to PLOF and address remaining functional goals. The manual therapy interventions were performed at a separate and distinct time from the therapeutic activities interventions . (see flow sheet as applicable)     Details if applicable:    Prone over 1 pillow - DTM along L rhomboids and TS paraspinals / TS CPA   10  31125 Therapeutic Exercise (timed):  increase ROM, strength, coordination, balance, and proprioception to improve patient's ability to progress to PLOF and address remaining functional goals.  (see flow sheet as applicable)     Details if applicable:            Details if applicable:            Details if applicable:            Details if applicable:     28  [facility] Totals Reminder: bill using total billable min of TIMED therapeutic procedures (example: do not include dry needle or estim unattended, both untimed codes, in totals to left)  8-22 min = 1 unit; 23-37 min = 2 units; 38-52 min = 3 units; 53-67 min = 4 units; 68-82 min = 5 units   Total Total     [x]  Patient Education billed concurrently with other procedures   [x] Review HEP    [] Progressed/Changed HEP, detail:  [] Other detail:       Objective Information/Functional Measures/Assessment    TTP along rhomboid    Felt better without less pain looking down in the L rhomboid post manual. Pt reported increased pain and weakness with shoulder ER, showing possible strength deficits here leading to compensation via the rhomboids    Patient will continue to benefit from skilled PT / OT services to modify and progress therapeutic interventions, analyze and address functional mobility deficits, analyze and address ROM deficits, analyze and address strength deficits, analyze and address soft tissue restrictions, analyze and cue for proper movement patterns, analyze and modify for postural abnormalities, and instruct in home and community integration to address functional deficits and attain remaining goals. Progress toward goals / Updated goals:  []  See Progress Note/Recertification    Short Term Goals: To be accomplished in 4 weeks  1) Pt will be IND with HEP to facilitate self care management. 2) Pt will improve thoracic rot ROM to >50% to order to improve ability to rotate without restriction  for ADLs  Progressing 2/23/23  3) Pt will report decreased in neurogenic symptoms >50% for ease of symptoms with ADLs: progressing well 3/9/23     Long Term Goals: To be accomplished in 6 weeks  1) Pt will maintain an average pain of 3/10 or less with all activities showing a progression in overall pain levels despite increases in activity. 2) Pt will improve FOTO scores to 67 in order to show detectable change in overall function. 3) Pt will be able to sleep without disturbance due to upper back pain >6 hours. Progressing well 3/9/23  4) Pt will improve thoracic rot ROM to >75% and cervical ROM >75% without symptoms to order to improve ability to move without restriction  for ADLs      All goals remain applicable at this time.      PLAN  Yes  Continue plan of care  [x]  Upgrade activities as tolerated  []  Discharge due to :  []  Other:    Jamie Aden PT    3/14/2023    10:07 AM    Future Appointments   Date Time Provider Madeline More   3/14/2023  4:30 PM Jamie Aden PT [facility]   3/16/2023  4:30 PM Gisel Garrison PTA [facility]   3/21/2023  5:00 PM Gisel Garrison PTA [facility]   3/23/2023  4:30 PM [facility]

## 2023-03-16 ENCOUNTER — HOSPITAL ENCOUNTER (OUTPATIENT)
Facility: HOSPITAL | Age: 53
Setting detail: RECURRING SERIES
Discharge: HOME | End: 2023-03-19
Payer: COMMERCIAL

## 2023-03-16 PROCEDURE — 97032 APPL MODALITY 1+ESTIM EA 15: CPT

## 2023-03-16 PROCEDURE — 97535 SELF CARE MNGMENT TRAINING: CPT

## 2023-03-16 NOTE — THERAPY RECERTIFICATION
[address] [facility] PHYSICAL THERAPY  [address] [facility] [place], [address] [facility] Ph: [phone] Fx: [phone]  PHYSICAL THERAPY PROGRESS NOTE  Patient Name: Marty Ambrosio : 1970   Treatment/Medical Diagnosis: Pain in thoracic spine [M54.6]   Referral Source: [place], [place]*     Date of Initial Visit: 23 Attended Visits: 8 Missed Visits: 0     SUMMARY OF TREATMENT  Pt seen in clinic for to address pain in thoracic region for 8 visits. Pt has been assessed, completed therapeutic exercises, neuromuscular re-ed, therapeutic functional activity, received manual therapy intervention, self-care strategies, HEP techniques and pt ed on condition consistency and follow-through. CURRENT STATUS    GROC: +5 a good deal better     Patient reports ~ 89% overall improvement with performance of all general ADLs and work related activities. Patient reports ~ 95% reduction of symptoms. Patient reports sitting tolerance: no problems, standing tolerance: no problems, walking tolerance: no problems. Pt will maintain an average pain of 3/10 or less with all activities showing a progression in overall pain levels despite increases in activity. Status at last Eval: Pain:   Current: 3/10     Worst: 9/10    Best: 2/10   Current Status: Patient reports max pain 3/10, least pain 0/10, average pain 1/10  Goal Met?  yes    2. Pt will improve FOTO scores to 67 in order to show detectable change in overall function. Status at last Eval: 67  Current Status: 67  Goal Met?  yes    3. Pt will be able to sleep without disturbance due to upper back pain >6 hours. Status at last Eval: unable  Current Status: Reports sleeping ~ 5 hours before onset of pain that will wake her up. Goal Met?   progressing    4.  Pt will improve thoracic rot ROM to >75% and cervical ROM >75% without symptoms to order to improve ability to move without restriction  for ADLs    Status at last

## 2023-03-16 NOTE — PROGRESS NOTES
Manual Therapy (timed):  decrease pain, increase ROM, and increase tissue extensibility to improve patient's ability to progress to PLOF and address remaining functional goals. The manual therapy interventions were performed at a separate and distinct time from the therapeutic activities interventions . (see flow sheet as applicable)     Details if applicable:    Prone over 1 pillow - DTM along L rhomboids and TS paraspinals / TS CPA     28257 Therapeutic Exercise (timed):  increase ROM, strength, coordination, balance, and proprioception to improve patient's ability to progress to PLOF and address remaining functional goals. (see flow sheet as applicable)     Details if applicable:     22     22787 Self Care/Home Management (timed):  improve patient knowledge and understanding of pain reducing techniques, positioning, posture/ergonomics, home safety, activity modification, and reassess goals and function  to improve patient's ability to progress to PLOF and address remaining functional goals. (see flow sheet as applicable)    Details if applicable:            Details if applicable:            Details if applicable:     27  [facility] Totals Reminder: bill using total billable min of TIMED therapeutic procedures (example: do not include dry needle or estim unattended, both untimed codes, in totals to left)  8-22 min = 1 unit; 23-37 min = 2 units; 38-52 min = 3 units; 53-67 min = 4 units; 68-82 min = 5 units   Total Total     [x]  Patient Education billed concurrently with other procedures   [x] Review HEP    [] Progressed/Changed HEP, detail:  [] Other detail:       Objective Information/Functional Measures/Assessment    GROC: +5 a good deal better     FOTO: 67     Patient reports max pain 3/10, least pain 0/10, average pain 1/10     Patient reports ~ 89% overall improvement with performance of all general ADLs and work related activities. Patient reports ~ 95% reduction of symptoms.      Patient reports sitting

## 2023-03-21 ENCOUNTER — HOSPITAL ENCOUNTER (OUTPATIENT)
Facility: HOSPITAL | Age: 53
Setting detail: RECURRING SERIES
Discharge: HOME | End: 2023-03-24
Payer: COMMERCIAL

## 2023-03-21 PROCEDURE — 97110 THERAPEUTIC EXERCISES: CPT

## 2023-03-21 PROCEDURE — 97140 MANUAL THERAPY 1/> REGIONS: CPT

## 2023-03-21 PROCEDURE — 97035 APP MDLTY 1+ULTRASOUND EA 15: CPT

## 2023-03-21 NOTE — PROGRESS NOTES
PHYSICAL / OCCUPATIONAL THERAPY - DAILY TREATMENT NOTE (updated )    Patient Name: Cinthia Perez    Date: 3/21/2023    : 1970  Insurance: Payor: Mayda Zaman / Plan: Mayda Zaman / Product Type: *No Product type* /      Patient  verified Yes     Visit #   Current / Total 9 20   Time   In / Out 500 610   Pain   In / Out 0 0   Subjective Functional Status/Changes: I felt so good after last session that I was able to sleep comfortably and even lie on that side without pain. Changes to:  Meds, Allergies, Med Hx, Sx Hx? If yes, update Summary List no       TREATMENT AREA =  Pain in thoracic spine [M54.6]    OBJECTIVE    Modalities Rationale:     decrease edema, decrease inflammation, and decrease pain to improve patient's ability to progress to PLOF and address remaining functional goals. min [] Estim Unattended, type/location:                                      []  w/ice    []  w/heat   8 min [x] Estim Attended, type/location: (L) rhomboid                                    [x]  w/US     []  w/ice    []  w/heat    []  TENS insruct      min []  Mechanical Traction: type/lbs                   []  pro   []  sup   []  int   []  cont    []  before manual    []  after manual    min []  Ultrasound, settings/location:      min []  Iontophoresis w/ dexamethasone, location:                                               []  take home patch       []  in clinic   10 min  unbill [x]  Ice     []  Heat    location/position: Supine TS    min []  Paraffin,  details:     min []  Vasopneumatic Device, press/temp:     min []  Ade Salter / Chano Encarnacion: If using vaso (only need to measure limb vaso being performed on)      pre-treatment girth :       post-treatment girth :       measured at (landmark location) :      min []  Other:    Skin assessment post-treatment (if applicable):    []  intact    []  redness- no adverse reaction                 []redness - adverse reaction:         Therapeutic Procedures:   Tx Min Billable or 1:1 Min

## 2023-03-23 ENCOUNTER — APPOINTMENT (OUTPATIENT)
Facility: HOSPITAL | Age: 53
End: 2023-03-23
Payer: COMMERCIAL

## 2023-03-30 ENCOUNTER — HOSPITAL ENCOUNTER (OUTPATIENT)
Facility: HOSPITAL | Age: 53
Setting detail: RECURRING SERIES
End: 2023-03-30
Payer: COMMERCIAL